# Patient Record
Sex: FEMALE | Race: WHITE
[De-identification: names, ages, dates, MRNs, and addresses within clinical notes are randomized per-mention and may not be internally consistent; named-entity substitution may affect disease eponyms.]

---

## 2018-08-18 ENCOUNTER — HOSPITAL ENCOUNTER (EMERGENCY)
Dept: HOSPITAL 43 - DL.ED | Age: 53
Discharge: HOME | End: 2018-08-18
Payer: COMMERCIAL

## 2018-08-18 DIAGNOSIS — N39.0: Primary | ICD-10-CM

## 2018-08-18 DIAGNOSIS — Z79.899: ICD-10-CM

## 2018-08-18 DIAGNOSIS — F32.9: ICD-10-CM

## 2018-08-18 NOTE — EDM.PDOC
ED HPI GENERAL MEDICAL PROBLEM





- General


Chief Complaint: Genitourinary Problem


Stated Complaint: BLADDER INFECTION [id number]


Time Seen by Provider: 08/18/18 12:53


Source of Information: Reports: Patient, RN, RN Notes Reviewed


History Limitations: Reports: No Limitations





- History of Present Illness


INITIAL COMMENTS - FREE TEXT/NARRATIVE: 


Pt to Er with c/o burning, frequency, and urgency of urination beginning this 

am. Pt states she is prone to UTI's. She denies visible blood in the urine, 

fever, chills, N/V/D. 


Onset: Today, Sudden





- Related Data


 Allergies











Allergy/AdvReac Type Severity Reaction Status Date / Time


 


Unable to Assess Allergy   Unverified 08/18/18 12:47











Home Meds: 


 Home Meds





Citalopram [Citalopram HBr] 20 mg PO DAILY 08/18/18 [History]











Past Medical History


HEENT History: Reports: None


Cardiovascular History: Reports: None


Respiratory History: Reports: None


Gastrointestinal History: Reports: None


Genitourinary History: Reports: UTI, Recurrent


Musculoskeletal History: Reports: None


Neurological History: Reports: None


Psychiatric History: Reports: Depression


Endocrine/Metabolic History: Reports: None


Hematologic History: Reports: None


Immunologic History: Reports: None


Oncologic (Cancer) History: Reports: None


Dermatologic History: Reports: None





Social & Family History





- Tobacco Use


Smoking Status *Q: Never Smoker


Second Hand Smoke Exposure: No





ED ROS GENERAL





- Review of Systems


Review Of Systems: ROS reveals no pertinent complaints other than HPI.





ED EXAM, RENAL/





- Physical Exam


Exam: See Below


Exam Limited By: No Limitations


General Appearance: Alert, WD/WN, No Apparent Distress


Eye Exam: Bilateral Eye: EOMI, Normal Inspection


Ears: Normal External Exam, Hearing Grossly Normal


Nose: Normal Inspection


Throat/Mouth: Normal Inspection, Normal Voice, No Airway Compromise


Head: Atraumatic, Normocephalic


Neck: Normal Inspection, Full Range of Motion


Respiratory/Chest: No Respiratory Distress, Lungs Clear, Normal Breath Sounds, 

No Accessory Muscle Use, Chest Non-Tender


Cardiovascular: Normal Peripheral Pulses, Regular Rate, Rhythm, No Edema, No 

Gallop, No JVD, No Murmur, No Rub


GI/Abdominal: Normal Bowel Sounds, Soft, Non-Tender


 (Female) Exam: Deferred


Rectal (Female) Exam: Deferred


Back Exam: Normal Inspection, Full Range of Motion.  No: CVA Tenderness (L), 

CVA Tenderness (R)


Extremities: Normal Inspection, Normal Range of Motion, Non-Tender, No Pedal 

Edema, Normal Capillary Refill


Neurological: Alert, Oriented, CN II-XII Intact, Normal Cognition, Normal Gait, 

Normal Reflexes, No Motor/Sensory Deficits


Psychiatric: Normal Affect, Normal Mood


Skin Exam: Warm, Dry, Intact, Normal Color, No Rash


Lymphatic: No Adenopathy





Course





- Vital Signs


Last Recorded V/S: 


 Last Vital Signs











Temp  98.0 F   08/18/18 11:46


 


Pulse  104 H  08/18/18 11:46


 


Resp  16   08/18/18 11:46


 


BP  128/86   08/18/18 11:46


 


Pulse Ox  95   08/18/18 11:46














- Orders/Labs/Meds


Labs: 


 Laboratory Tests











  08/18/18 Range/Units





  11:38 


 


Urine Color  Yellow  (YELLOW)  


 


Urine Appearance  Cloudy  (CLEAR)  


 


Urine pH  7.0  (5.0-9.0)  


 


Ur Specific Gravity  1.010  (1.005-1.030)  


 


Urine Protein  Negative  (NEGATIVE)  


 


Urine Glucose (UA)  Negative  (NEGATIVE)  


 


Urine Ketones  Negative  (NEGATIVE)  


 


Urine Occult Blood  Moderate H  (NEGATIVE)  


 


Urine Nitrite  Negative  (NEGATIVE)  


 


Urine Bilirubin  Negative  (NEGATIVE)  


 


Urine Urobilinogen  0.2  (0.2-1.0)  mg/dL


 


Ur Leukocyte Esterase  Moderate H  (NEGATIVE)  


 


Urine RBC  5-10 H  /HPF


 


Urine WBC   H  (0-5/HPF)  /HPF


 


Ur Epithelial Cells  Few  /HPF


 


Urine Bacteria  Few  (0-FEW/HPF)  /HPF














Departure





- Departure


Time of Disposition: 12:57


Disposition: Home, Self-Care 01


Condition: Good


Clinical Impression: 


UTI (urinary tract infection)


Qualifiers:


 Urinary tract infection type: site unspecified Hematuria presence: without 

hematuria Qualified Code(s): N39.0 - Urinary tract infection, site not specified








- Discharge Information


*PRESCRIPTION DRUG MONITORING PROGRAM REVIEWED*: No


*COPY OF PRESCRIPTION DRUG MONITORING REPORT IN PATIENT RADHA: No


Instructions:  Urinary Tract Infection, Adult, Easy-to-Read


Referrals: 


PCP,Not In Area [Primary Care Provider] - 


Forms:  ED Department Discharge


Additional Instructions: 


RX: Pyridium, Macrobid


Drink plenty of fluids


Follow up with your primary care facility for recheck of the urine